# Patient Record
Sex: FEMALE | Race: BLACK OR AFRICAN AMERICAN
[De-identification: names, ages, dates, MRNs, and addresses within clinical notes are randomized per-mention and may not be internally consistent; named-entity substitution may affect disease eponyms.]

---

## 2020-01-08 ENCOUNTER — HOSPITAL ENCOUNTER (EMERGENCY)
Dept: HOSPITAL 35 - ER | Age: 70
Discharge: HOME | End: 2020-01-08
Payer: COMMERCIAL

## 2020-01-08 VITALS — WEIGHT: 160.01 LBS | HEIGHT: 67 IN | BODY MASS INDEX: 25.11 KG/M2

## 2020-01-08 VITALS — DIASTOLIC BLOOD PRESSURE: 80 MMHG | SYSTOLIC BLOOD PRESSURE: 149 MMHG
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
## 2020-01-08 DIAGNOSIS — S83.8X2A: Primary | ICD-10-CM

## 2020-01-08 DIAGNOSIS — F17.210: ICD-10-CM

## 2020-01-08 DIAGNOSIS — Z88.8: ICD-10-CM

## 2020-03-14 ENCOUNTER — HOSPITAL ENCOUNTER (EMERGENCY)
Dept: HOSPITAL 35 - ER | Age: 70
LOS: 1 days | Discharge: HOME | End: 2020-03-15
Payer: COMMERCIAL

## 2020-03-14 VITALS — BODY MASS INDEX: 25.11 KG/M2 | WEIGHT: 160.01 LBS | HEIGHT: 67 IN

## 2020-03-14 DIAGNOSIS — J18.9: Primary | ICD-10-CM

## 2020-03-14 DIAGNOSIS — F17.210: ICD-10-CM

## 2020-03-15 VITALS — SYSTOLIC BLOOD PRESSURE: 140 MMHG | DIASTOLIC BLOOD PRESSURE: 87 MMHG

## 2020-07-29 ENCOUNTER — HOSPITAL ENCOUNTER (EMERGENCY)
Dept: HOSPITAL 35 - ER | Age: 70
Discharge: HOME | End: 2020-07-29
Payer: COMMERCIAL

## 2020-07-29 VITALS — SYSTOLIC BLOOD PRESSURE: 139 MMHG | DIASTOLIC BLOOD PRESSURE: 80 MMHG

## 2020-07-29 VITALS — BODY MASS INDEX: 23.14 KG/M2 | HEIGHT: 66 IN | WEIGHT: 144.01 LBS

## 2020-07-29 DIAGNOSIS — K21.9: ICD-10-CM

## 2020-07-29 DIAGNOSIS — E11.9: ICD-10-CM

## 2020-07-29 DIAGNOSIS — J44.1: Primary | ICD-10-CM

## 2020-07-29 DIAGNOSIS — Z79.899: ICD-10-CM

## 2020-07-29 DIAGNOSIS — I10: ICD-10-CM

## 2020-07-29 DIAGNOSIS — F17.210: ICD-10-CM

## 2020-07-29 DIAGNOSIS — Z88.8: ICD-10-CM

## 2020-07-29 DIAGNOSIS — E78.5: ICD-10-CM

## 2020-07-29 LAB
%HYPO/RBC NFR BLD AUTO: (no result) %
ALBUMIN SERPL-MCNC: 3.5 G/DL (ref 3.4–5)
ALT SERPL-CCNC: 16 U/L (ref 30–65)
ANION GAP SERPL CALC-SCNC: 7 MMOL/L (ref 7–16)
ANISOCYTOSIS BLD QL SMEAR: (no result)
AST SERPL-CCNC: 12 U/L (ref 15–37)
BASOPHILS NFR BLD AUTO: 1 % (ref 0–2)
BILIRUB DIRECT SERPL-MCNC: < 0.1 MG/DL
BILIRUB SERPL-MCNC: 0.2 MG/DL (ref 0.2–1)
BUN SERPL-MCNC: 11 MG/DL (ref 7–18)
CALCIUM SERPL-MCNC: 9.1 MG/DL (ref 8.5–10.1)
CHLORIDE SERPL-SCNC: 105 MMOL/L (ref 98–107)
CO2 SERPL-SCNC: 27 MMOL/L (ref 21–32)
CREAT SERPL-MCNC: 1.3 MG/DL (ref 0.6–1)
EOSINOPHIL NFR BLD: 0.9 % (ref 0–3)
ERYTHROCYTE [DISTWIDTH] IN BLOOD BY AUTOMATED COUNT: 20.3 % (ref 10.5–14.5)
GLUCOSE SERPL-MCNC: 175 MG/DL (ref 74–106)
GRANULOCYTES NFR BLD MANUAL: 55.4 % (ref 36–66)
HCT VFR BLD CALC: 33.2 % (ref 37–47)
HGB BLD-MCNC: 10.6 GM/DL (ref 12–15)
LYMPHOCYTES NFR BLD AUTO: 34.6 % (ref 24–44)
MCH RBC QN AUTO: 22.3 PG (ref 26–34)
MCHC RBC AUTO-ENTMCNC: 31.8 G/DL (ref 28–37)
MCV RBC: 70.1 FL (ref 80–100)
MICROCYTES: (no result)
MONOCYTES NFR BLD: 8.1 % (ref 1–8)
NEUTROPHILS # BLD: 3.5 THOU/UL (ref 1.4–8.2)
PLATELET # BLD EST: NORMAL 10*3/UL
PLATELET # BLD: 283 THOU/UL (ref 150–400)
POTASSIUM SERPL-SCNC: 4.6 MMOL/L (ref 3.5–5.1)
PROT SERPL-MCNC: 6.8 G/DL (ref 6.4–8.2)
RBC # BLD AUTO: 4.74 MIL/UL (ref 4.2–5)
SODIUM SERPL-SCNC: 139 MMOL/L (ref 136–145)
WBC # BLD AUTO: 6.4 THOU/UL (ref 4–11)

## 2020-07-31 NOTE — EKG
[facility]
[place]   [address]                     ELECTROCARDIOGRAM REPORT      
_______________________________________________________________________________
 
Name:       MICHAEL BLUE                 Room #:                     [facility]#:      [id number]                       Account #:      [id number]  
Admission:  20    Attend Phys:                          
Discharge:  20    Date of Birth:  [birth year]  
                                                          Report #: [id number]
                                                                    [id number]
_______________________________________________________________________________
THIS REPORT FOR:  
 
cc:  Gaetano Baca James A. DO Lundgren, Craig H. MD [facility]
THIS REPORT FOR:   //name//                          
 
                         [facility] ED
                                       
Test Date:    2020               Test Time:    14:52:46
Pat Name:     MICHAEL BLUE            Department:   
Patient ID:   [id number]            Room:          
Gender:       F                        Technician:   [facility]
:          1950               Requested By: Kaela Ramos
Order Number: [id number] MD:   Cameron Moreno
                                 Measurements
Intervals                              Axis          
Rate:         87                       P:            65
DE:           216                      QRS:          52
QRSD:         74                       T:            73
QT:           379                                    
QTc:          456                                    
                           Interpretive Statements
Sinus rhythm
Borderline prolonged DE interval
Anterior infarct, old
No previous ECG available for comparison
Electronically Signed On 2020 8:50:56 CDT by Cameron Moreno
[email or web address]
 
 
 
 
 
 
 
 
 
 
 
 
 
 
 
  <ELECTRONICALLY SIGNED>
   By: Cameron Moreno MD, [facility]   
  20     0850
D: 20 1452                           _____________________________________
T: 20 1452                           Cameron Moreno MD, [facility]     /EPI

## 2020-10-06 ENCOUNTER — HOSPITAL ENCOUNTER (EMERGENCY)
Dept: HOSPITAL 35 - ER | Age: 70
Discharge: HOME | End: 2020-10-06
Payer: COMMERCIAL

## 2020-10-06 VITALS — BODY MASS INDEX: 23.3 KG/M2 | HEIGHT: 66 IN | WEIGHT: 145 LBS

## 2020-10-06 VITALS — SYSTOLIC BLOOD PRESSURE: 143 MMHG | DIASTOLIC BLOOD PRESSURE: 54 MMHG

## 2020-10-06 DIAGNOSIS — F17.210: ICD-10-CM

## 2020-10-06 DIAGNOSIS — I10: ICD-10-CM

## 2020-10-06 DIAGNOSIS — Z88.8: ICD-10-CM

## 2020-10-06 DIAGNOSIS — R05: Primary | ICD-10-CM

## 2020-10-06 DIAGNOSIS — E11.9: ICD-10-CM

## 2020-10-06 DIAGNOSIS — Z79.899: ICD-10-CM

## 2020-10-06 DIAGNOSIS — K21.9: ICD-10-CM

## 2020-10-06 DIAGNOSIS — E78.5: ICD-10-CM

## 2021-04-16 ENCOUNTER — HOSPITAL ENCOUNTER (OUTPATIENT)
Dept: HOSPITAL 35 - SJCVCIMAG | Age: 71
End: 2021-04-16
Attending: FAMILY MEDICINE
Payer: COMMERCIAL

## 2021-04-16 DIAGNOSIS — M79.606: ICD-10-CM

## 2021-04-16 DIAGNOSIS — I73.9: Primary | ICD-10-CM
